# Patient Record
Sex: MALE | Race: WHITE | NOT HISPANIC OR LATINO | URBAN - METROPOLITAN AREA
[De-identification: names, ages, dates, MRNs, and addresses within clinical notes are randomized per-mention and may not be internally consistent; named-entity substitution may affect disease eponyms.]

---

## 2022-11-23 ENCOUNTER — PROCEDURE (OUTPATIENT)
Dept: URBAN - METROPOLITAN AREA CLINIC 58 | Facility: CLINIC | Age: 52
End: 2022-11-23

## 2022-11-23 DIAGNOSIS — T15.02XA FOREIGN BODY IN CORNEA, LEFT EYE, INITIAL ENCOUNTER: Primary | ICD-10-CM

## 2022-11-23 PROCEDURE — 65222 REMOVE FOREIGN BODY FROM EYE: CPT | Performed by: OPTOMETRIST

## 2022-11-23 RX ORDER — TOBRAMYCIN AND DEXAMETHASONE 3; 1 MG/ML; MG/ML
SUSPENSION/ DROPS OPHTHALMIC
Qty: 5 | Refills: 0 | Status: ACTIVE
Start: 2022-11-23

## 2022-11-23 ASSESSMENT — INTRAOCULAR PRESSURE: OD: 13

## 2022-11-23 NOTE — IMPRESSION/PLAN
Impression: Foreign body in cornea, left eye, initial encounter: T15.02XA. Plan: Discussed diagnosis in detail with patient. Risks/benefits of removal explained. Pt wants removal. Metallic FB and rust ring removed almost entirely with algerbrush without complication. Start Tobradex OS QID. Will continue to monitor. Call if worse. Day before Thanksgiving, I gave my cell phone number to pt if his eye gets worse to give me a call and I will try to see him. [___ Device Check] : is here today for a [unfilled] device check for [Family Member] : family member [Chest Pain] : chest pain [Coronary Artery Disease] : coronary artery disease

## 2022-11-28 ENCOUNTER — OFFICE VISIT (OUTPATIENT)
Dept: URBAN - METROPOLITAN AREA CLINIC 58 | Facility: CLINIC | Age: 52
End: 2022-11-28

## 2022-11-28 DIAGNOSIS — T15.02XD FOREIGN BODY IN CORNEA, LEFT EYE, SUBSEQUENT ENCOUNTER: Primary | ICD-10-CM

## 2022-11-28 DIAGNOSIS — H53.022 REFRACTIVE AMBLYOPIA, LEFT EYE: ICD-10-CM

## 2022-11-28 PROCEDURE — 99203 OFFICE O/P NEW LOW 30 MIN: CPT | Performed by: OPHTHALMOLOGY

## 2022-11-28 PROCEDURE — 65222 REMOVE FOREIGN BODY FROM EYE: CPT | Performed by: OPHTHALMOLOGY

## 2022-11-28 ASSESSMENT — INTRAOCULAR PRESSURE
OD: 15
OS: 15

## 2022-11-28 NOTE — IMPRESSION/PLAN
Impression: Foreign body in cornea, left eye, subsequent encounter: T15.02XD. Plan: Discussed diagnosis in detail with patient. Discussed R/B/A's of removal. Rust rings removed with algerbrush without complications in slit lamp. Continue Tobradex OS QID x 1 wk. Monitor. Call if worse.

## 2022-12-05 ENCOUNTER — OFFICE VISIT (OUTPATIENT)
Dept: URBAN - METROPOLITAN AREA CLINIC 58 | Facility: CLINIC | Age: 52
End: 2022-12-05

## 2022-12-05 DIAGNOSIS — T15.02XD FOREIGN BODY IN CORNEA, LEFT EYE, SUBSEQUENT ENCOUNTER: Primary | ICD-10-CM

## 2022-12-05 PROCEDURE — 99212 OFFICE O/P EST SF 10 MIN: CPT | Performed by: OPTOMETRIST

## 2022-12-05 NOTE — IMPRESSION/PLAN
Impression: Foreign body in cornea, left eye, subsequent encounter: T15.02XD. Plan: Discussed diagnosis in detail with patient. No need to remove anymore rust.   Continue Tobradex OS TID x 3 days then d/c. Call if worse.